# Patient Record
Sex: MALE | Race: WHITE | Employment: STUDENT | URBAN - METROPOLITAN AREA
[De-identification: names, ages, dates, MRNs, and addresses within clinical notes are randomized per-mention and may not be internally consistent; named-entity substitution may affect disease eponyms.]

---
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
## 2023-05-14 RX ORDER — KETOCONAZOLE 20 MG/ML
SHAMPOO TOPICAL
COMMUNITY
Start: 2021-08-03

## 2025-07-07 NOTE — BH NOTE
Medication trials  Medication Effect Side effects   Concerta Effective for focus                     History of Suicide Attempts: none  SIs, HIs, self-harm: none    Mental Status Examination     I. Reliability in Providing Information: Good      II. Personal Presentation: Looks stated age; dressed appropriately      III. Motor Activity: Normal       IV. Speech Pattern:  Normal      V. Mood: Euthymic during most of the visit    Vl. Eye Contact:  Appropriate       Vll. Affect: Appropriate for context; tearful at times      VllI. Thought Processes        Thought Process:  goal-directed and logical          Thought Content: No delusions, phobias, obsessions, or compulsions        Hallucinations: None        Suicidal Ideation/Attempts: No         Homicidal Ideation/Attempts: No         Self-harm: No           IX. Cognitive Functions       Orientation Level:  Oriented x4         Neurologic State: Alert         Attention/Concentration: Attentive       Abstract Thinking: Intact        Estimate of Intelligence: Average        Judgment/insight: Good         Memory/concentration: Short/long-term intact                    X.Risks: None evident                 XI. Strengths and Assets Inventory:   Intelligence  Cooperative  Participates in care  Family support  Employment status: adequate   Living arrangements: stable  Interests/Hobbies     Clinical Formulation: Michael is an introspective young person who would like help with his symptoms of ADHD. Treatment of inattention and decreased focus would be instrumental in helping him set goals for his future. Treatment of anxiety, depression, and sleep disturbance is recommended when and if he is amenable.    DSM 5 Diagnoses and plan: [facility]   reviewed    Attention deficit hyperactivity disorder (F90.2)  Discussed stimulant versus non stimulant treatment; informed consent obtained for patient's choice of non stimulant atomoxetine 25 mg QAM; ends 9/7    Anxiety/panic

## 2025-07-08 ENCOUNTER — HOSPITAL ENCOUNTER (OUTPATIENT)
Facility: HOSPITAL | Age: 20
Discharge: HOME | End: 2025-07-11
Payer: COMMERCIAL

## 2025-07-08 DIAGNOSIS — F90.2 ADHD (ATTENTION DEFICIT HYPERACTIVITY DISORDER), COMBINED TYPE: Primary | ICD-10-CM

## 2025-07-08 DIAGNOSIS — F41.8 ANXIETY ASSOCIATED WITH DEPRESSION: ICD-10-CM

## 2025-07-08 PROCEDURE — 90792 PSYCH DIAG EVAL W/MED SRVCS: CPT | Performed by: MIDWIFE

## 2025-07-08 RX ORDER — ATOMOXETINE 25 MG/1
25 CAPSULE ORAL DAILY
Qty: 30 CAPSULE | Refills: 1 | Status: SHIPPED | OUTPATIENT
Start: 2025-07-08 | End: 2025-07-09

## 2025-07-09 RX ORDER — ATOMOXETINE 25 MG/1
25 CAPSULE ORAL DAILY
Qty: 30 CAPSULE | Refills: 1 | Status: SHIPPED | OUTPATIENT
Start: 2025-07-09 | End: 2025-09-07

## 2025-07-10 PROBLEM — F41.8 ANXIETY ASSOCIATED WITH DEPRESSION: Status: ACTIVE | Noted: 2025-07-10

## 2025-08-06 ENCOUNTER — HOSPITAL ENCOUNTER (OUTPATIENT)
Facility: HOSPITAL | Age: 20
Discharge: HOME | End: 2025-08-09
Payer: COMMERCIAL

## 2025-08-06 DIAGNOSIS — F90.2 ADHD (ATTENTION DEFICIT HYPERACTIVITY DISORDER), COMBINED TYPE: Primary | ICD-10-CM

## 2025-08-06 DIAGNOSIS — F41.8 ANXIETY ASSOCIATED WITH DEPRESSION: ICD-10-CM

## 2025-08-06 PROCEDURE — 99214 OFFICE O/P EST MOD 30 MIN: CPT | Performed by: MIDWIFE

## 2025-08-06 RX ORDER — ATOMOXETINE 40 MG/1
40 CAPSULE ORAL DAILY
Qty: 30 CAPSULE | Refills: 1 | Status: SHIPPED | OUTPATIENT
Start: 2025-08-06 | End: 2025-10-05